# Patient Record
Sex: FEMALE | Race: WHITE | URBAN - NONMETROPOLITAN AREA
[De-identification: names, ages, dates, MRNs, and addresses within clinical notes are randomized per-mention and may not be internally consistent; named-entity substitution may affect disease eponyms.]

---

## 2020-07-09 ENCOUNTER — TELEPHONE ENCOUNTER (OUTPATIENT)
Dept: URBAN - NONMETROPOLITAN AREA CLINIC 2 | Facility: CLINIC | Age: 40
End: 2020-07-09

## 2020-07-09 RX ORDER — ONDANSETRON 8 MG/1
DISSOLVE  1 TABLET BY ORAL ROUTE 2 TIMES A DAY AS NEEDED  NAUSEA TABLET, ORALLY DISINTEGRATING ORAL 2
Qty: 60 | Refills: 3
Start: 2019-10-04

## 2020-07-30 ENCOUNTER — OFFICE VISIT (OUTPATIENT)
Dept: URBAN - NONMETROPOLITAN AREA CLINIC 13 | Facility: CLINIC | Age: 40
End: 2020-07-30
Payer: MEDICARE

## 2020-07-30 DIAGNOSIS — K31.84 GASTROPARESIS: ICD-10-CM

## 2020-07-30 DIAGNOSIS — K59.00 CONSTIPATION: ICD-10-CM

## 2020-07-30 DIAGNOSIS — F41.1 ANXIETY: ICD-10-CM

## 2020-07-30 DIAGNOSIS — K63.5 COLON POLYPS: ICD-10-CM

## 2020-07-30 DIAGNOSIS — R10.84 GENERALIZED ABDOMINAL PAIN: ICD-10-CM

## 2020-07-30 DIAGNOSIS — Z15.09 LYNCH SYNDROME: ICD-10-CM

## 2020-07-30 DIAGNOSIS — K21.9 GASTROESOPHAGEAL REFLUX DISEASE, ESOPHAGITIS PRESENCE NOT SPECIFIED: ICD-10-CM

## 2020-07-30 PROCEDURE — G8417 CALC BMI ABV UP PARAM F/U: HCPCS | Performed by: INTERNAL MEDICINE

## 2020-07-30 PROCEDURE — G8427 DOCREV CUR MEDS BY ELIG CLIN: HCPCS | Performed by: INTERNAL MEDICINE

## 2020-07-30 PROCEDURE — 99214 OFFICE O/P EST MOD 30 MIN: CPT | Performed by: INTERNAL MEDICINE

## 2020-07-30 PROCEDURE — G9903 PT SCRN TBCO ID AS NON USER: HCPCS | Performed by: INTERNAL MEDICINE

## 2020-07-30 RX ORDER — FAMOTIDINE 20 MG/1
1 TABLET AT BEDTIME TABLET, FILM COATED ORAL ONCE A DAY
Qty: 90 TABLET | Refills: 3 | OUTPATIENT
Start: 2020-07-30

## 2020-07-30 RX ORDER — FAMOTIDINE 40 MG/1
TAKE 1 TABLET (40 MG) BY ORAL ROUTE ONCE DAILY AT BEDTIME FOR 90 DAYS TABLET ORAL 1
Qty: 90 | Refills: 3 | Status: ACTIVE | COMMUNITY
Start: 2019-10-04 | End: 2020-09-28

## 2020-07-30 RX ORDER — HYDROCORTISONE 25 MG/G
1 APPLICATION CREAM TOPICAL TWICE A DAY
Qty: 30 GRAM | Refills: 3 | OUTPATIENT
Start: 2020-07-30 | End: 2020-09-24

## 2020-07-30 RX ORDER — ONDANSETRON 8 MG/1
DISSOLVE  1 TABLET BY ORAL ROUTE 2 TIMES A DAY AS NEEDED  NAUSEA TABLET, ORALLY DISINTEGRATING ORAL 2
Qty: 60 | Refills: 3 | Status: ACTIVE | COMMUNITY
Start: 2019-10-04

## 2020-07-30 RX ORDER — OMEPRAZOLE 40 MG/1
TAKE 1 CAPSULE BY ORAL ROUTE DAILY FOR 90 DAYS CAPSULE, DELAYED RELEASE PELLETS ORAL 1
Qty: 90 | Refills: 3 | Status: ACTIVE | COMMUNITY
Start: 2019-10-04 | End: 2020-09-28

## 2020-07-30 NOTE — HPI-TODAY'S VISIT:
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  She is to see 1 of the doctors at [facility] about this.  Patient's last colonoscopy was in November 2019 at which time several large TIAs were removed. In general, patient has an normal bowel movement.  Stools range from daily to every other day.  On occasion they are hard.  She may have to strain on occasion.  She does have some hemorrhoids that act up periodically and is having some problem currently.  She is noted some bleeding over the last couple days. She is having diarrhea today.  She has had 6-7 loose watery stools.  She has no abdominal pain.  She denies fever or shaking chills.  She is not sure if this is due to something that she is eaten. Patient's reflux symptoms are well controlled on omeprazole and famotidine. Patient has some questionable early satiety.  She has some mild nausea.  She has no belching or regurgitation.  She denies postprandial distention.  Patient does take at least 1 pain pill daily.  Some days she may take more.

## 2020-10-07 ENCOUNTER — ERX REFILL RESPONSE (OUTPATIENT)
Age: 40
End: 2020-10-07

## 2020-10-07 RX ORDER — OMEPRAZOLE 40 MG/1
TAKE 1 CAPSULE BY MOUTH EVERY DAY CAPSULE, DELAYED RELEASE ORAL
Qty: 90 CAPSULES | Refills: 3

## 2020-10-29 ENCOUNTER — LAB OUTSIDE AN ENCOUNTER (OUTPATIENT)
Dept: URBAN - NONMETROPOLITAN AREA CLINIC 13 | Facility: CLINIC | Age: 40
End: 2020-10-29

## 2020-10-29 ENCOUNTER — OFFICE VISIT (OUTPATIENT)
Dept: URBAN - NONMETROPOLITAN AREA CLINIC 13 | Facility: CLINIC | Age: 40
End: 2020-10-29
Payer: MEDICARE

## 2020-10-29 DIAGNOSIS — K59.00 CONSTIPATION: ICD-10-CM

## 2020-10-29 DIAGNOSIS — F41.1 ANXIETY: ICD-10-CM

## 2020-10-29 DIAGNOSIS — R13.10 ESOPHAGEAL DYSPHAGIA: ICD-10-CM

## 2020-10-29 DIAGNOSIS — K31.84 GASTROPARESIS: ICD-10-CM

## 2020-10-29 DIAGNOSIS — K63.5 COLON POLYPS: ICD-10-CM

## 2020-10-29 DIAGNOSIS — K21.9 GASTROESOPHAGEAL REFLUX DISEASE, ESOPHAGITIS PRESENCE NOT SPECIFIED: ICD-10-CM

## 2020-10-29 DIAGNOSIS — R10.84 GENERALIZED ABDOMINAL PAIN: ICD-10-CM

## 2020-10-29 PROCEDURE — G9902 PT SCRN TBCO AND ID AS USER: HCPCS | Performed by: INTERNAL MEDICINE

## 2020-10-29 PROCEDURE — G8427 DOCREV CUR MEDS BY ELIG CLIN: HCPCS | Performed by: INTERNAL MEDICINE

## 2020-10-29 PROCEDURE — G8484 FLU IMMUNIZE NO ADMIN: HCPCS | Performed by: INTERNAL MEDICINE

## 2020-10-29 PROCEDURE — G8417 CALC BMI ABV UP PARAM F/U: HCPCS | Performed by: INTERNAL MEDICINE

## 2020-10-29 PROCEDURE — 99214 OFFICE O/P EST MOD 30 MIN: CPT | Performed by: INTERNAL MEDICINE

## 2020-10-29 RX ORDER — ONDANSETRON 8 MG/1
1 TABLET ON THE TONGUE AND ALLOW TO DISSOLVE  AS NEEDED TABLET, ORALLY DISINTEGRATING ORAL
Qty: 60 TABLET | Refills: 3 | OUTPATIENT
Start: 2020-10-29

## 2020-10-29 RX ORDER — OMEPRAZOLE 40 MG/1
TAKE 1 CAPSULE BY MOUTH EVERY DAY CAPSULE, DELAYED RELEASE ORAL
Qty: 90 CAPSULES | Refills: 3 | Status: ACTIVE | COMMUNITY

## 2020-10-29 RX ORDER — OMEPRAZOLE 20 MG/1
BREAKFAST AND SUPPER TABLET, DELAYED RELEASE ORAL TWICE DAILY
Qty: 180 TABLET | Refills: 3 | OUTPATIENT
Start: 2020-10-29

## 2020-10-29 RX ORDER — SODIUM, POTASSIUM,MAG SULFATES 17.5-3.13G
354ML SOLUTION, RECONSTITUTED, ORAL ORAL
Qty: 354 MILLILITER | Refills: 0 | OUTPATIENT
Start: 2020-10-29 | End: 2020-10-30

## 2020-10-29 RX ORDER — ONDANSETRON 8 MG/1
DISSOLVE  1 TABLET BY ORAL ROUTE 2 TIMES A DAY AS NEEDED  NAUSEA TABLET, ORALLY DISINTEGRATING ORAL 2
Qty: 60 | Refills: 3 | Status: ACTIVE | COMMUNITY
Start: 2019-10-04

## 2020-10-29 RX ORDER — FAMOTIDINE 20 MG/1
1 TABLET AT BEDTIME TABLET, FILM COATED ORAL ONCE A DAY
Qty: 90 TABLET | Refills: 3 | Status: ACTIVE | COMMUNITY
Start: 2020-07-30

## 2020-10-29 NOTE — HPI-TODAY'S VISIT:
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  According to their note insurance would not pay for genetic testing so they were trying to obtain copies of the colonoscopy reports to see if they could document multiple polyps and thereby try to get insurance to pay for the testing.  The patient states that she they were going to call her once they got the information but she is not heard from them.  On her last colonoscopy she had 5 large polyps and one small one.  These were all tubular adenomas. Patient's bowel movements are normal.  Her constipation is well controlled.  She has no evidence of bleeding.  She has occasional left lower quadrant pain that is better with a bowel movement. She does have persistent heartburn despite taking omeprazole in the morning and Pepcid at night.  She states that the heartburn awakens her about 4 AM.  It is partially relieved with Tums or milk.  She does have a great deal of belching.  She has some nausea and frequent vomiting in the morning.  She does have some early satiety as well. Patient has difficulty swallowing large pills and meat.  She states that she is become a vegetarian because she has difficulty getting meat down.  She has not had to vomit up her food at this point.  She has never had an esophageal stricture. Patient remains on narcotics for chronic neck pain.  This is probably the cause of her delayed gastric emptying.

## 2020-12-15 ENCOUNTER — OFFICE VISIT (OUTPATIENT)
Dept: URBAN - NONMETROPOLITAN AREA SURGERY CENTER 1 | Facility: SURGERY CENTER | Age: 40
End: 2020-12-15
Payer: MEDICARE

## 2020-12-15 ENCOUNTER — CLAIMS CREATED FROM THE CLAIM WINDOW (OUTPATIENT)
Dept: URBAN - METROPOLITAN AREA CLINIC 4 | Facility: CLINIC | Age: 40
End: 2020-12-15
Payer: MEDICARE

## 2020-12-15 DIAGNOSIS — K21.9 ACID REFLUX: ICD-10-CM

## 2020-12-15 DIAGNOSIS — D12.3 BENIGN NEOPLASM OF TRANSVERSE COLON: ICD-10-CM

## 2020-12-15 DIAGNOSIS — K31.89 OTHER DISEASES OF STOMACH AND DUODENUM: ICD-10-CM

## 2020-12-15 DIAGNOSIS — Z86.010 H/O ADENOMATOUS POLYP OF COLON: ICD-10-CM

## 2020-12-15 DIAGNOSIS — R13.19 CERVICAL DYSPHAGIA: ICD-10-CM

## 2020-12-15 DIAGNOSIS — K21.00 GASTRO-ESOPHAGEAL REFLUX DISEASE WITH ESOPHAGITIS, WITHOUT BLEEDING: ICD-10-CM

## 2020-12-15 DIAGNOSIS — D12.3 ADENOMA OF TRANSVERSE COLON: ICD-10-CM

## 2020-12-15 PROCEDURE — G9936 PMH PLYP/NEO CO/RECT/JUN/ANS: HCPCS | Performed by: INTERNAL MEDICINE

## 2020-12-15 PROCEDURE — 45385 COLONOSCOPY W/LESION REMOVAL: CPT | Performed by: INTERNAL MEDICINE

## 2020-12-15 PROCEDURE — 88312 SPECIAL STAINS GROUP 1: CPT | Performed by: PATHOLOGY

## 2020-12-15 PROCEDURE — 43450 DILATE ESOPHAGUS 1/MULT PASS: CPT | Performed by: INTERNAL MEDICINE

## 2020-12-15 PROCEDURE — 88305 TISSUE EXAM BY PATHOLOGIST: CPT | Performed by: PATHOLOGY

## 2020-12-15 PROCEDURE — G8907 PT DOC NO EVENTS ON DISCHARG: HCPCS | Performed by: INTERNAL MEDICINE

## 2020-12-15 PROCEDURE — 43239 EGD BIOPSY SINGLE/MULTIPLE: CPT | Performed by: INTERNAL MEDICINE

## 2021-02-02 ENCOUNTER — WEB ENCOUNTER (OUTPATIENT)
Dept: URBAN - NONMETROPOLITAN AREA CLINIC 2 | Facility: CLINIC | Age: 41
End: 2021-02-02

## 2021-02-25 ENCOUNTER — LAB OUTSIDE AN ENCOUNTER (OUTPATIENT)
Dept: URBAN - NONMETROPOLITAN AREA CLINIC 13 | Facility: CLINIC | Age: 41
End: 2021-02-25

## 2021-02-25 ENCOUNTER — DASHBOARD ENCOUNTERS (OUTPATIENT)
Age: 41
End: 2021-02-25

## 2021-02-25 ENCOUNTER — OFFICE VISIT (OUTPATIENT)
Dept: URBAN - NONMETROPOLITAN AREA CLINIC 13 | Facility: CLINIC | Age: 41
End: 2021-02-25
Payer: MEDICARE

## 2021-02-25 DIAGNOSIS — R13.10 ESOPHAGEAL DYSPHAGIA: ICD-10-CM

## 2021-02-25 DIAGNOSIS — K63.5 COLON POLYPS: ICD-10-CM

## 2021-02-25 DIAGNOSIS — R10.84 GENERALIZED ABDOMINAL PAIN: ICD-10-CM

## 2021-02-25 DIAGNOSIS — K59.00 CONSTIPATION: ICD-10-CM

## 2021-02-25 DIAGNOSIS — K31.84 GASTROPARESIS: ICD-10-CM

## 2021-02-25 DIAGNOSIS — K21.9 GASTROESOPHAGEAL REFLUX DISEASE, ESOPHAGITIS PRESENCE NOT SPECIFIED: ICD-10-CM

## 2021-02-25 DIAGNOSIS — F41.1 ANXIETY: ICD-10-CM

## 2021-02-25 PROCEDURE — 99214 OFFICE O/P EST MOD 30 MIN: CPT | Performed by: NURSE PRACTITIONER

## 2021-02-25 RX ORDER — ONDANSETRON 8 MG/1
1 TABLET ON THE TONGUE AND ALLOW TO DISSOLVE  AS NEEDED TABLET, ORALLY DISINTEGRATING ORAL
Qty: 60 TABLET | Refills: 3 | Status: ACTIVE | COMMUNITY
Start: 2020-10-29

## 2021-02-25 RX ORDER — SUCRALFATE 1 G/1
1 TABLET ON AN EMPTY STOMACH TABLET ORAL TWICE A DAY
Qty: 60 TABLET | Refills: 3 | OUTPATIENT
Start: 2021-02-25 | End: 2021-06-25

## 2021-02-25 RX ORDER — FAMOTIDINE 40 MG/1
1 TABLET AT BEDTIME AS NEEDED TABLET, FILM COATED ORAL ONCE A DAY
Qty: 30 TABLETS | Refills: 3 | OUTPATIENT
Start: 2021-02-25

## 2021-02-25 RX ORDER — ONDANSETRON 8 MG/1
DISSOLVE  1 TABLET BY ORAL ROUTE 2 TIMES A DAY AS NEEDED  NAUSEA TABLET, ORALLY DISINTEGRATING ORAL 2
Qty: 60 TABLET | Refills: 2
Start: 2019-10-04

## 2021-02-25 RX ORDER — ONDANSETRON 8 MG/1
DISSOLVE  1 TABLET BY ORAL ROUTE 2 TIMES A DAY AS NEEDED  NAUSEA TABLET, ORALLY DISINTEGRATING ORAL 2
Qty: 60 | Refills: 3 | Status: ACTIVE | COMMUNITY
Start: 2019-10-04

## 2021-02-25 RX ORDER — FAMOTIDINE 20 MG/1
1 TABLET AT BEDTIME TABLET, FILM COATED ORAL ONCE A DAY
Qty: 90 TABLET | Refills: 3 | Status: ACTIVE | COMMUNITY
Start: 2020-07-30

## 2021-02-25 RX ORDER — OMEPRAZOLE 20 MG/1
BREAKFAST AND SUPPER TABLET, DELAYED RELEASE ORAL TWICE DAILY
Qty: 180 TABLET | Refills: 3 | Status: ACTIVE | COMMUNITY
Start: 2020-10-29

## 2021-02-25 RX ORDER — OMEPRAZOLE 40 MG/1
TAKE 1 CAPSULE BY MOUTH EVERY DAY CAPSULE, DELAYED RELEASE ORAL
Qty: 90 CAPSULES | Refills: 3 | Status: ACTIVE | COMMUNITY

## 2021-02-25 RX ORDER — OMEPRAZOLE 40 MG/1
1 CAPSULE TWICE DAILY BEFORE A MEAL CAPSULE, DELAYED RELEASE ORAL BID
Qty: 60 CAPSULE | Refills: 3
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
## 2021-02-28 PROBLEM — 40890009: Status: ACTIVE | Noted: 2020-10-29

## 2021-03-15 ENCOUNTER — ERX REFILL RESPONSE (OUTPATIENT)
Dept: URBAN - NONMETROPOLITAN AREA CLINIC 13 | Facility: CLINIC | Age: 41
End: 2021-03-15

## 2021-03-15 RX ORDER — OMEPRAZOLE 40 MG/1
TAKE 1 CAPSULE BY MOUTH TWICE A DAY BEFORE A MEAL CAPSULE, DELAYED RELEASE ORAL
Qty: 180 CAPSULE | Refills: 2 | OUTPATIENT

## 2021-03-15 RX ORDER — OMEPRAZOLE 40 MG/1
1 CAPSULE TWICE DAILY BEFORE A MEAL CAPSULE, DELAYED RELEASE ORAL BID
Qty: 60 | Refills: 3 | OUTPATIENT

## 2021-04-22 ENCOUNTER — OFFICE VISIT (OUTPATIENT)
Dept: URBAN - NONMETROPOLITAN AREA CLINIC 13 | Facility: CLINIC | Age: 41
End: 2021-04-22

## 2021-06-03 ENCOUNTER — TELEPHONE ENCOUNTER (OUTPATIENT)
Dept: URBAN - NONMETROPOLITAN AREA CLINIC 2 | Facility: CLINIC | Age: 41
End: 2021-06-03

## 2021-06-03 RX ORDER — ONDANSETRON 8 MG/1
DISSOLVE  1 TABLET BY ORAL ROUTE 2 TIMES A DAY AS NEEDED  NAUSEA TABLET, ORALLY DISINTEGRATING ORAL 2
Qty: 60 TABLET | Refills: 2
Start: 2019-10-04

## 2021-07-02 ENCOUNTER — TELEPHONE ENCOUNTER (OUTPATIENT)
Dept: URBAN - NONMETROPOLITAN AREA CLINIC 2 | Facility: CLINIC | Age: 41
End: 2021-07-02

## 2021-07-02 RX ORDER — SUCRALFATE 1 G/1
1 TABLET ON AN EMPTY STOMACH TABLET ORAL TWICE A DAY
Qty: 60 TABLET | Refills: 3
Start: 2021-02-25 | End: 2021-10-30

## 2021-07-07 ENCOUNTER — TELEPHONE ENCOUNTER (OUTPATIENT)
Dept: URBAN - NONMETROPOLITAN AREA CLINIC 2 | Facility: CLINIC | Age: 41
End: 2021-07-07

## 2021-07-07 RX ORDER — SUCRALFATE 1 G/1
1 TABLET ON AN EMPTY STOMACH TABLET ORAL TWICE A DAY
Qty: 60 TABLET | Refills: 3
Start: 2021-02-25 | End: 2021-11-04

## 2021-08-28 ENCOUNTER — TELEPHONE ENCOUNTER (OUTPATIENT)
Dept: URBAN - METROPOLITAN AREA CLINIC 13 | Facility: CLINIC | Age: 41
End: 2021-08-28

## 2021-08-29 ENCOUNTER — TELEPHONE ENCOUNTER (OUTPATIENT)
Dept: URBAN - METROPOLITAN AREA CLINIC 13 | Facility: CLINIC | Age: 41
End: 2021-08-29

## 2021-09-15 ENCOUNTER — OFFICE VISIT (OUTPATIENT)
Dept: URBAN - NONMETROPOLITAN AREA CLINIC 13 | Facility: CLINIC | Age: 41
End: 2021-09-15